# Patient Record
Sex: MALE | Race: OTHER | NOT HISPANIC OR LATINO | URBAN - METROPOLITAN AREA
[De-identification: names, ages, dates, MRNs, and addresses within clinical notes are randomized per-mention and may not be internally consistent; named-entity substitution may affect disease eponyms.]

---

## 2017-04-20 ENCOUNTER — INPATIENT (INPATIENT)
Facility: HOSPITAL | Age: 67
LOS: 0 days | Discharge: HOME | DRG: 247 | End: 2017-04-21
Attending: INTERNAL MEDICINE | Admitting: INTERNAL MEDICINE
Payer: MEDICARE

## 2017-04-20 VITALS
RESPIRATION RATE: 16 BRPM | DIASTOLIC BLOOD PRESSURE: 87 MMHG | SYSTOLIC BLOOD PRESSURE: 140 MMHG | OXYGEN SATURATION: 97 % | WEIGHT: 155.87 LBS | HEIGHT: 67 IN | TEMPERATURE: 96 F | HEART RATE: 64 BPM

## 2017-04-20 DIAGNOSIS — K40.20 BILATERAL INGUINAL HERNIA, WITHOUT OBSTRUCTION OR GANGRENE, NOT SPECIFIED AS RECURRENT: Chronic | ICD-10-CM

## 2017-04-20 LAB
ALBUMIN SERPL ELPH-MCNC: 3.3 G/DL — LOW (ref 3.4–5)
ALP SERPL-CCNC: 76 U/L — SIGNIFICANT CHANGE UP (ref 40–120)
ALT FLD-CCNC: 18 U/L — SIGNIFICANT CHANGE UP (ref 12–42)
ANION GAP SERPL CALC-SCNC: 8 MMOL/L — LOW (ref 9–16)
APTT BLD: 67 SEC — HIGH (ref 27.5–37.4)
AST SERPL-CCNC: 21 U/L — SIGNIFICANT CHANGE UP (ref 15–37)
BASOPHILS NFR BLD AUTO: 0.4 % — SIGNIFICANT CHANGE UP (ref 0–2)
BILIRUB SERPL-MCNC: 0.7 MG/DL — SIGNIFICANT CHANGE UP (ref 0.2–1.2)
BUN SERPL-MCNC: 11 MG/DL — SIGNIFICANT CHANGE UP (ref 7–23)
CALCIUM SERPL-MCNC: 8.2 MG/DL — LOW (ref 8.5–10.5)
CHLORIDE SERPL-SCNC: 107 MMOL/L — SIGNIFICANT CHANGE UP (ref 96–108)
CHOLEST SERPL-MCNC: 111 MG/DL — SIGNIFICANT CHANGE UP
CK MB CFR SERPL CALC: 4.7 NG/ML — HIGH (ref 0.5–3.6)
CO2 SERPL-SCNC: 26 MMOL/L — SIGNIFICANT CHANGE UP (ref 22–31)
CREAT SERPL-MCNC: 1.14 MG/DL — SIGNIFICANT CHANGE UP (ref 0.5–1.3)
EOSINOPHIL NFR BLD AUTO: 1.6 % — SIGNIFICANT CHANGE UP (ref 0–6)
GLUCOSE SERPL-MCNC: 88 MG/DL — SIGNIFICANT CHANGE UP (ref 70–99)
HBA1C BLD-MCNC: 5.9 % — HIGH (ref 4.8–5.6)
HCT VFR BLD CALC: 37.5 % — LOW (ref 39–50)
HDLC SERPL-MCNC: 57 MG/DL — SIGNIFICANT CHANGE UP
HGB BLD-MCNC: 12.7 G/DL — LOW (ref 13–17)
INR BLD: 1.04 — SIGNIFICANT CHANGE UP (ref 0.88–1.16)
LIPID PNL WITH DIRECT LDL SERPL: 35 MG/DL — SIGNIFICANT CHANGE UP
LYMPHOCYTES # BLD AUTO: 34.5 % — SIGNIFICANT CHANGE UP (ref 13–44)
MCHC RBC-ENTMCNC: 27.8 PG — SIGNIFICANT CHANGE UP (ref 27–34)
MCHC RBC-ENTMCNC: 33.9 G/DL — SIGNIFICANT CHANGE UP (ref 32–36)
MCV RBC AUTO: 82.1 FL — SIGNIFICANT CHANGE UP (ref 80–100)
MONOCYTES NFR BLD AUTO: 9.5 % — SIGNIFICANT CHANGE UP (ref 2–14)
NEUTROPHILS NFR BLD AUTO: 54 % — SIGNIFICANT CHANGE UP (ref 43–77)
PLATELET # BLD AUTO: 339 K/UL — SIGNIFICANT CHANGE UP (ref 150–400)
POTASSIUM SERPL-MCNC: 3.5 MMOL/L — SIGNIFICANT CHANGE UP (ref 3.5–5.3)
POTASSIUM SERPL-SCNC: 3.5 MMOL/L — SIGNIFICANT CHANGE UP (ref 3.5–5.3)
PROT SERPL-MCNC: 6.6 G/DL — SIGNIFICANT CHANGE UP (ref 6.4–8.2)
PROTHROM AB SERPL-ACNC: 11.5 SEC — SIGNIFICANT CHANGE UP (ref 9.8–12.7)
RBC # BLD: 4.57 M/UL — SIGNIFICANT CHANGE UP (ref 4.2–5.8)
RBC # FLD: 14 % — SIGNIFICANT CHANGE UP (ref 10.3–16.9)
SODIUM SERPL-SCNC: 141 MMOL/L — SIGNIFICANT CHANGE UP (ref 135–145)
TOTAL CHOLESTEROL/HDL RATIO MEASUREMENT: 1.9 RATIO — SIGNIFICANT CHANGE UP
TRIGL SERPL-MCNC: 96 MG/DL — SIGNIFICANT CHANGE UP
WBC # BLD: 6.8 K/UL — SIGNIFICANT CHANGE UP (ref 3.8–10.5)
WBC # FLD AUTO: 6.8 K/UL — SIGNIFICANT CHANGE UP (ref 3.8–10.5)

## 2017-04-20 PROCEDURE — 93454 CORONARY ARTERY ANGIO S&I: CPT | Mod: 26,XU

## 2017-04-20 PROCEDURE — 93010 ELECTROCARDIOGRAM REPORT: CPT

## 2017-04-20 PROCEDURE — 92928 PRQ TCAT PLMT NTRAC ST 1 LES: CPT | Mod: LC

## 2017-04-20 PROCEDURE — 92921: CPT | Mod: LC

## 2017-04-20 RX ORDER — CLOPIDOGREL BISULFATE 75 MG/1
75 TABLET, FILM COATED ORAL DAILY
Qty: 0 | Refills: 0 | Status: DISCONTINUED | OUTPATIENT
Start: 2017-04-21 | End: 2017-04-21

## 2017-04-20 RX ORDER — PANTOPRAZOLE SODIUM 20 MG/1
40 TABLET, DELAYED RELEASE ORAL
Qty: 0 | Refills: 0 | Status: DISCONTINUED | OUTPATIENT
Start: 2017-04-20 | End: 2017-04-21

## 2017-04-20 RX ORDER — ATORVASTATIN CALCIUM 80 MG/1
40 TABLET, FILM COATED ORAL AT BEDTIME
Qty: 0 | Refills: 0 | Status: DISCONTINUED | OUTPATIENT
Start: 2017-04-20 | End: 2017-04-21

## 2017-04-20 RX ORDER — SODIUM CHLORIDE 9 MG/ML
500 INJECTION INTRAMUSCULAR; INTRAVENOUS; SUBCUTANEOUS
Qty: 0 | Refills: 0 | Status: DISCONTINUED | OUTPATIENT
Start: 2017-04-20 | End: 2017-04-21

## 2017-04-20 RX ORDER — PANTOPRAZOLE SODIUM 20 MG/1
1 TABLET, DELAYED RELEASE ORAL
Qty: 0 | Refills: 0 | COMMUNITY

## 2017-04-20 RX ORDER — AMLODIPINE BESYLATE 2.5 MG/1
1 TABLET ORAL
Qty: 0 | Refills: 0 | COMMUNITY

## 2017-04-20 RX ORDER — FOLIC ACID 0.8 MG
1 TABLET ORAL DAILY
Qty: 0 | Refills: 0 | Status: DISCONTINUED | OUTPATIENT
Start: 2017-04-20 | End: 2017-04-21

## 2017-04-20 RX ORDER — CLOPIDOGREL BISULFATE 75 MG/1
575 TABLET, FILM COATED ORAL ONCE
Qty: 0 | Refills: 0 | Status: DISCONTINUED | OUTPATIENT
Start: 2017-04-20 | End: 2017-04-20

## 2017-04-20 RX ORDER — ISOSORBIDE MONONITRATE 60 MG/1
1 TABLET, EXTENDED RELEASE ORAL
Qty: 0 | Refills: 0 | COMMUNITY

## 2017-04-20 RX ORDER — THIAMINE MONONITRATE (VIT B1) 100 MG
1 TABLET ORAL
Qty: 0 | Refills: 0 | COMMUNITY

## 2017-04-20 RX ORDER — ASPIRIN/CALCIUM CARB/MAGNESIUM 324 MG
81 TABLET ORAL DAILY
Qty: 0 | Refills: 0 | Status: DISCONTINUED | OUTPATIENT
Start: 2017-04-20 | End: 2017-04-21

## 2017-04-20 RX ORDER — AMLODIPINE BESYLATE 2.5 MG/1
10 TABLET ORAL DAILY
Qty: 0 | Refills: 0 | Status: DISCONTINUED | OUTPATIENT
Start: 2017-04-20 | End: 2017-04-21

## 2017-04-20 RX ORDER — POTASSIUM CHLORIDE 20 MEQ
40 PACKET (EA) ORAL ONCE
Qty: 0 | Refills: 0 | Status: COMPLETED | OUTPATIENT
Start: 2017-04-20 | End: 2017-04-20

## 2017-04-20 RX ORDER — CLOPIDOGREL BISULFATE 75 MG/1
525 TABLET, FILM COATED ORAL ONCE
Qty: 0 | Refills: 0 | Status: COMPLETED | OUTPATIENT
Start: 2017-04-20 | End: 2017-04-20

## 2017-04-20 RX ORDER — ASPIRIN/CALCIUM CARB/MAGNESIUM 324 MG
243 TABLET ORAL ONCE
Qty: 0 | Refills: 0 | Status: COMPLETED | OUTPATIENT
Start: 2017-04-20 | End: 2017-04-20

## 2017-04-20 RX ORDER — FOLIC ACID 0.8 MG
1 TABLET ORAL
Qty: 0 | Refills: 0 | COMMUNITY

## 2017-04-20 RX ORDER — ISOSORBIDE MONONITRATE 60 MG/1
30 TABLET, EXTENDED RELEASE ORAL DAILY
Qty: 0 | Refills: 0 | Status: DISCONTINUED | OUTPATIENT
Start: 2017-04-20 | End: 2017-04-21

## 2017-04-20 RX ORDER — CHLORHEXIDINE GLUCONATE 213 G/1000ML
1 SOLUTION TOPICAL ONCE
Qty: 0 | Refills: 0 | Status: DISCONTINUED | OUTPATIENT
Start: 2017-04-20 | End: 2017-04-21

## 2017-04-20 RX ORDER — THIAMINE MONONITRATE (VIT B1) 100 MG
100 TABLET ORAL DAILY
Qty: 0 | Refills: 0 | Status: DISCONTINUED | OUTPATIENT
Start: 2017-04-20 | End: 2017-04-21

## 2017-04-20 RX ORDER — ATORVASTATIN CALCIUM 80 MG/1
1 TABLET, FILM COATED ORAL
Qty: 0 | Refills: 0 | COMMUNITY

## 2017-04-20 RX ADMIN — CLOPIDOGREL BISULFATE 525 MILLIGRAM(S): 75 TABLET, FILM COATED ORAL at 17:13

## 2017-04-20 RX ADMIN — AMLODIPINE BESYLATE 10 MILLIGRAM(S): 2.5 TABLET ORAL at 21:32

## 2017-04-20 RX ADMIN — Medication 100 MILLIGRAM(S): at 21:32

## 2017-04-20 RX ADMIN — ATORVASTATIN CALCIUM 40 MILLIGRAM(S): 80 TABLET, FILM COATED ORAL at 21:32

## 2017-04-20 RX ADMIN — Medication 243 MILLIGRAM(S): at 17:16

## 2017-04-20 RX ADMIN — PANTOPRAZOLE SODIUM 40 MILLIGRAM(S): 20 TABLET, DELAYED RELEASE ORAL at 21:32

## 2017-04-20 RX ADMIN — Medication 40 MILLIEQUIVALENT(S): at 17:24

## 2017-04-20 RX ADMIN — ISOSORBIDE MONONITRATE 30 MILLIGRAM(S): 60 TABLET, EXTENDED RELEASE ORAL at 21:32

## 2017-04-20 RX ADMIN — Medication 10 MILLIGRAM(S): at 21:32

## 2017-04-20 RX ADMIN — Medication 1 MILLIGRAM(S): at 21:32

## 2017-04-20 NOTE — H&P ADULT - HISTORY OF PRESENT ILLNESS
SKELETON    67 yo male, current cocaine abuser, PMHx HTN, hyperlipidemia, CVA (thalamic stroke 11/2016) presented to [facility] on 4/19/17 endorsing diaphoresis, n/v, dizziness and pt lost consciousness for a few seconds. After syncopal episode pt endorsed nonradiating 7/10 stabbing chest pain. Pt had been drinking with friends and utox was positive for cocaine. Pt admitted for ACS. Peak troponin 0.216. Pt had LATOYA with Cr 2 on admission which resolved to 1.1 after IV hydration. Diagnostic cath on 4/19/17 showed LM normal, D1 80%. dLCX 80%. OM1 90%. mRCA 80%. dRCA 90%. EF 60%. EDP 9. Echo 4/19/17 mild concentric LVH. EF 60-65%. Impaired relaxation. LA mildly dilated. Mild TR. 65 yo male, current cocaine abuser, PMHx HTN, hyperlipidemia, CVA (ischemic, thalamic stroke 11/2016 with residual left sided weakness) presented to [facility] ED on 4/19/17 endorsing diaphoresis, n/v, dizziness and pt lost consciousness for a few seconds. After syncopal episode pt endorsed nonradiating 7/10 stabbing chest pain at rest. Pt had been drinking with friends and utox was positive for cocaine. Pt admitted for ACS. Peak troponin 0.216. Pt had LATOYA with Cr 2 on admission which resolved to 1.1 after IV hydration. Diagnostic cath on 4/19/17 showed LM normal, D1 80%. dLCX 80%. OM1 90%. mRCA 80%. dRCA 90%. EF 60%. EDP 9. Echo 4/19/17 mild concentric LVH. EF 60-65%. Impaired relaxation. LA mildly dilated. Mild TR. Pt now transferred to [facility] for staged PCI. Pt was given Aspirin 81mg and Plavix 75mg on 4/20/17 prior to transfer. Pt denies being on Plavix at home and was given Plavix 525mg and Aspirin 243mg prior to procedure to complete load. K 3.5, repleted with KCl 40mequ. Pt was asymptomatic on arrival to [facility] cath lab. Pt was on a heparin drip which will be d/c'd prior to cardiac cath. EKG showed NSR @ 65 bpm with RBBB. Neuro exam wnl except for left pronator drift. Strength 5/5 all extremities left not significantly weaker than right. No facial droop, pupils equal small b/l, EOMI, A&Ox3. Pt precathed and consented for left cardiac catheterization with staged PCI with Dr. Hernandez.

## 2017-04-20 NOTE — H&P ADULT - ASSESSMENT
67 yo male, current cocaine abuser, PMHx HTN, hyperlipidemia, CVA (ischemic, thalamic stroke 11/2016 with residual left sided weakness) presented to [facility] on 4/19/17 endorsing diaphoresis, n/v, dizziness and pt lost consciousness for a few seconds. After syncopal episode pt endorsed nonradiating 7/10 stabbing chest pain at rest. Pt had been drinking with friends and utox was positive for cocaine. Pt admitted for ACS. Peak troponin 0.216. Pt had LATOYA with Cr 2 on admission which resolved to 1.1 after IV hydration. Diagnostic cath on 4/19/17 showed LM normal, D1 80%. dLCX 80%. OM1 90%. mRCA 80%. dRCA 90%. EF 60%. EDP 9. Echo 4/19/17 mild concentric LVH. EF 60-65%. Impaired relaxation. LA mildly dilated. Mild TR. Pt now transferred to [facility] for staged PCI. Pt was given Aspirin 81mg and Plavix 75mg on 4/20/17 prior to transfer. Pt denies being on Plavix at home and was given Plavix 525mg and Aspirin 243mg prior to procedure to complete load. K 3.5, repleted with KCl 40mequ. Pt was asymptomatic on arrival to [facility] cath lab. Pt was on a heparin drip which will be d/c'd prior to cardiac cath. EKG showed NSR @ 65 bpm with RBBB. Neuro exam wnl except for left pronator drift. Strength 5/5 all extremities left not significantly weaker than right. No facial droop, pupils equal small b/l, EOMI, A&Ox3. Pt precathed and consented for left cardiac catheterization with staged PCI with Dr. Hernanedz.

## 2017-04-20 NOTE — H&P ADULT - MOTOR
Strength 5/5 all extremities, positive left pronator drift, no significant difference in strength in extremities.

## 2017-04-21 VITALS
RESPIRATION RATE: 16 BRPM | HEART RATE: 69 BPM | DIASTOLIC BLOOD PRESSURE: 84 MMHG | OXYGEN SATURATION: 99 % | SYSTOLIC BLOOD PRESSURE: 142 MMHG

## 2017-04-21 LAB
ANION GAP SERPL CALC-SCNC: 9 MMOL/L — SIGNIFICANT CHANGE UP (ref 9–16)
BUN SERPL-MCNC: 13 MG/DL — SIGNIFICANT CHANGE UP (ref 7–23)
CALCIUM SERPL-MCNC: 8.3 MG/DL — LOW (ref 8.5–10.5)
CHLORIDE SERPL-SCNC: 105 MMOL/L — SIGNIFICANT CHANGE UP (ref 96–108)
CO2 SERPL-SCNC: 26 MMOL/L — SIGNIFICANT CHANGE UP (ref 22–31)
CREAT SERPL-MCNC: 1.11 MG/DL — SIGNIFICANT CHANGE UP (ref 0.5–1.3)
GLUCOSE SERPL-MCNC: 105 MG/DL — HIGH (ref 70–99)
HCT VFR BLD CALC: 33.1 % — LOW (ref 39–50)
HGB BLD-MCNC: 11.1 G/DL — LOW (ref 13–17)
MAGNESIUM SERPL-MCNC: 2 MG/DL — SIGNIFICANT CHANGE UP (ref 1.6–2.4)
MCHC RBC-ENTMCNC: 27.4 PG — SIGNIFICANT CHANGE UP (ref 27–34)
MCHC RBC-ENTMCNC: 33.5 G/DL — SIGNIFICANT CHANGE UP (ref 32–36)
MCV RBC AUTO: 81.7 FL — SIGNIFICANT CHANGE UP (ref 80–100)
PLATELET # BLD AUTO: 286 K/UL — SIGNIFICANT CHANGE UP (ref 150–400)
POTASSIUM SERPL-MCNC: 3.3 MMOL/L — LOW (ref 3.5–5.3)
POTASSIUM SERPL-SCNC: 3.3 MMOL/L — LOW (ref 3.5–5.3)
RBC # BLD: 4.05 M/UL — LOW (ref 4.2–5.8)
RBC # FLD: 14 % — SIGNIFICANT CHANGE UP (ref 10.3–16.9)
SODIUM SERPL-SCNC: 140 MMOL/L — SIGNIFICANT CHANGE UP (ref 135–145)
WBC # BLD: 7.9 K/UL — SIGNIFICANT CHANGE UP (ref 3.8–10.5)
WBC # FLD AUTO: 7.9 K/UL — SIGNIFICANT CHANGE UP (ref 3.8–10.5)

## 2017-04-21 PROCEDURE — C1769: CPT

## 2017-04-21 PROCEDURE — 80053 COMPREHEN METABOLIC PANEL: CPT

## 2017-04-21 PROCEDURE — C1887: CPT

## 2017-04-21 PROCEDURE — 85025 COMPLETE CBC W/AUTO DIFF WBC: CPT

## 2017-04-21 PROCEDURE — 99239 HOSP IP/OBS DSCHRG MGMT >30: CPT

## 2017-04-21 PROCEDURE — C1889: CPT

## 2017-04-21 PROCEDURE — 83735 ASSAY OF MAGNESIUM: CPT

## 2017-04-21 PROCEDURE — 82553 CREATINE MB FRACTION: CPT

## 2017-04-21 PROCEDURE — 93005 ELECTROCARDIOGRAM TRACING: CPT

## 2017-04-21 PROCEDURE — 85027 COMPLETE CBC AUTOMATED: CPT

## 2017-04-21 PROCEDURE — C1874: CPT

## 2017-04-21 PROCEDURE — C1725: CPT

## 2017-04-21 PROCEDURE — 83036 HEMOGLOBIN GLYCOSYLATED A1C: CPT

## 2017-04-21 PROCEDURE — 80048 BASIC METABOLIC PNL TOTAL CA: CPT

## 2017-04-21 PROCEDURE — 93010 ELECTROCARDIOGRAM REPORT: CPT

## 2017-04-21 PROCEDURE — 80061 LIPID PANEL: CPT

## 2017-04-21 PROCEDURE — 36415 COLL VENOUS BLD VENIPUNCTURE: CPT

## 2017-04-21 PROCEDURE — 85610 PROTHROMBIN TIME: CPT

## 2017-04-21 PROCEDURE — C1894: CPT

## 2017-04-21 PROCEDURE — 85730 THROMBOPLASTIN TIME PARTIAL: CPT

## 2017-04-21 PROCEDURE — C1760: CPT

## 2017-04-21 PROCEDURE — 82550 ASSAY OF CK (CPK): CPT

## 2017-04-21 RX ORDER — ASPIRIN/CALCIUM CARB/MAGNESIUM 324 MG
1 TABLET ORAL
Qty: 0 | Refills: 0 | COMMUNITY

## 2017-04-21 RX ORDER — CLOPIDOGREL BISULFATE 75 MG/1
1 TABLET, FILM COATED ORAL
Qty: 0 | Refills: 0 | COMMUNITY

## 2017-04-21 RX ORDER — ASPIRIN/CALCIUM CARB/MAGNESIUM 324 MG
1 TABLET ORAL
Qty: 30 | Refills: 11 | OUTPATIENT
Start: 2017-04-21 | End: 2018-04-15

## 2017-04-21 RX ORDER — CLOPIDOGREL BISULFATE 75 MG/1
1 TABLET, FILM COATED ORAL
Qty: 30 | Refills: 11 | OUTPATIENT
Start: 2017-04-21 | End: 2018-04-15

## 2017-04-21 RX ORDER — POTASSIUM CHLORIDE 20 MEQ
60 PACKET (EA) ORAL ONCE
Qty: 0 | Refills: 0 | Status: COMPLETED | OUTPATIENT
Start: 2017-04-21 | End: 2017-04-21

## 2017-04-21 RX ADMIN — Medication 100 MILLIGRAM(S): at 12:43

## 2017-04-21 RX ADMIN — AMLODIPINE BESYLATE 10 MILLIGRAM(S): 2.5 TABLET ORAL at 12:50

## 2017-04-21 RX ADMIN — ISOSORBIDE MONONITRATE 30 MILLIGRAM(S): 60 TABLET, EXTENDED RELEASE ORAL at 12:43

## 2017-04-21 RX ADMIN — CLOPIDOGREL BISULFATE 75 MILLIGRAM(S): 75 TABLET, FILM COATED ORAL at 12:43

## 2017-04-21 RX ADMIN — Medication 60 MILLIEQUIVALENT(S): at 12:43

## 2017-04-21 RX ADMIN — Medication 1 MILLIGRAM(S): at 12:43

## 2017-04-21 RX ADMIN — PANTOPRAZOLE SODIUM 40 MILLIGRAM(S): 20 TABLET, DELAYED RELEASE ORAL at 06:19

## 2017-04-21 RX ADMIN — Medication 10 MILLIGRAM(S): at 06:19

## 2017-04-21 RX ADMIN — Medication 81 MILLIGRAM(S): at 12:43

## 2017-04-21 NOTE — DISCHARGE NOTE ADULT - HOSPITAL COURSE
65 y/o male cocaine user with hx of CVA HTN and HLD, transferred to [facility] for PCI of CAD seen on dx cath at [facility].  Pt now s/p PCI ZEINAB OM1. Pt to return to [facility] for staged PCI of RCA.    Pt stable for discharge.  VSS  Labs acceptable, potassium repleted.  Physical exam unremarkable, groin stable.  Pt to follow up with Dr. Ryan Velazquez and to return for staged PCI.

## 2017-04-21 NOTE — DISCHARGE NOTE ADULT - MEDICATION SUMMARY - MEDICATIONS TO TAKE
I will START or STAY ON the medications listed below when I get home from the hospital:    aspirin 81 mg oral tablet  -- 1 tab(s) by mouth once a day  -- Indication: For Stent    Vasotec 10 mg oral tablet  -- 1 tab(s) by mouth once a day  -- Indication: For Hypertension    isosorbide mononitrate 30 mg oral tablet, extended release  -- 1 tab(s) by mouth once a day (in the morning)  -- Indication: For Angina    Lipitor 40 mg oral tablet  -- 1 tab(s) by mouth once a day  -- Indication: For Cholesterol    Plavix 75 mg oral tablet  -- 1 tab(s) by mouth once a day  -- Indication: For Stent    Norvasc 10 mg oral tablet  -- 1 tab(s) by mouth once a day  -- Indication: For Hypertension    Protonix 40 mg oral delayed release tablet  -- 1 tab(s) by mouth once a day  -- Indication: For GERD    Vitamin B1 100 mg oral tablet  -- 1 tab(s) by mouth once a day  -- Indication: For Supplement    folic acid 1 mg oral tablet  -- 1 tab(s) by mouth once a day  -- Indication: For Supplement

## 2017-04-21 NOTE — DISCHARGE NOTE ADULT - CARE PLAN
Principal Discharge DX:	CAD (coronary artery disease)  Goal:	Continue taking Aspirin and Plavix every day as prescribed.  Instructions for follow-up, activity and diet:	Follow up with Dr. Ryan Velazquez in 1-2 weeks.  Address: [facility] [place] [address]  Phone: [phone]    The procedure was done through your right groin. Please monitor for any signs of bleeding, blood collection under the skin, extreme pain or any signs of infection. If you experience any of these symptoms, please call your doctor or go to the nearest ER.   You can shower but no baths for 5 days.   No strenuous activities or heavy lifting for at least 5 days. Principal Discharge DX:	CAD (coronary artery disease)  Goal:	Continue taking Aspirin and Plavix every day as prescribed.  Instructions for follow-up, activity and diet:	Follow up with Dr. Ryan Velazquez in 1-2 weeks.  Address: [facility] [place] [address]  Phone: [phone]    The procedure was done through your right groin. Please monitor for any signs of bleeding, blood collection under the skin, extreme pain or any signs of infection. If you experience any of these symptoms, please call your doctor or go to the nearest ER.   You can shower but no baths for 5 days.   No strenuous activities or heavy lifting for at least 5 days.

## 2017-04-21 NOTE — DISCHARGE NOTE ADULT - CARE PROVIDER_API CALL
Ryan Velazquez), Cardiology; Interventional Cardiology  [address] [place] [address]  Phone: [phone]  Fax: [phone]

## 2017-04-21 NOTE — DISCHARGE NOTE ADULT - PATIENT PORTAL LINK FT
“You can access the FollowHealth Patient Portal, offered by [facility], by registering with the following website: [email or web address]”

## 2017-04-24 DIAGNOSIS — I45.10 UNSPECIFIED RIGHT BUNDLE-BRANCH BLOCK: ICD-10-CM

## 2017-04-24 DIAGNOSIS — I10 ESSENTIAL (PRIMARY) HYPERTENSION: ICD-10-CM

## 2017-04-24 DIAGNOSIS — Z79.02 LONG TERM (CURRENT) USE OF ANTITHROMBOTICS/ANTIPLATELETS: ICD-10-CM

## 2017-04-24 DIAGNOSIS — I25.10 ATHEROSCLEROTIC HEART DISEASE OF NATIVE CORONARY ARTERY WITHOUT ANGINA PECTORIS: ICD-10-CM

## 2017-04-24 DIAGNOSIS — F14.10 COCAINE ABUSE, UNCOMPLICATED: ICD-10-CM

## 2017-04-24 DIAGNOSIS — I69.354 HEMIPLEGIA AND HEMIPARESIS FOLLOWING CEREBRAL INFARCTION AFFECTING LEFT NON-DOMINANT SIDE: ICD-10-CM

## 2017-04-24 DIAGNOSIS — E78.5 HYPERLIPIDEMIA, UNSPECIFIED: ICD-10-CM

## 2017-04-24 DIAGNOSIS — Z79.82 LONG TERM (CURRENT) USE OF ASPIRIN: ICD-10-CM

## 2017-05-15 RX ORDER — CHLORHEXIDINE GLUCONATE 213 G/1000ML
1 SOLUTION TOPICAL ONCE
Qty: 0 | Refills: 0 | Status: DISCONTINUED | OUTPATIENT
Start: 2017-05-24 | End: 2017-05-25

## 2017-05-15 NOTE — H&P ADULT - NSHPPHYSICALEXAM_GEN_ALL_CORE
Appearance: Normal	  HEENT:   Normal oral mucosa, PERRL, EOMI	  Neck: Supple,  - JVD; No Carotid Bruit and 2+ pulses B/L  Cardiovascular: Normal S1 S2, No JVD, No murmurs  Respiratory: Lungs clear to auscultation/No Rales, Rhonchi, Wheezing	  Gastrointestinal:  Soft, Non-tender	  Skin: No rashes, No ecchymoses, No cyanosis  Extremities: Normal range of motion, No clubbing, cyanosis or edema  Vascular: Femoral pulses 2+ b/l without bruit, DP 1+ b/l, PT 1+ b/l  Neurologic: Non-focal  Psychiatry: A & O x 3, Mood & affect appropriate

## 2017-05-15 NOTE — H&P ADULT - HISTORY OF PRESENT ILLNESS
SKELETON    65 yo male, current cocaine abuser, PMHx HTN, hyperlipidemia, CVA (ischemic, thalamic stroke 11/2016 with residual left sided weakness), known CAD initially presented to [facility] with unstable angina requiring cardiac catheterization 4/19/2017 revealing LM normal, D1 80%. dLCX 80%. OM1 90%. mRCA 80%. dRCA 90%. EF 60%. Pt was subsequently transferred to [facility] for staged PCI of OM1 and is now s/p ZEINAB to lower branch of OM1 followed by PTCA of the OM1 upper branch lesion and kissing of the bifurcation lesion 4/20/17. Pt now recommended  returns for staged PCI of residual RCA disease. Pt reports (insert symptoms).    In light of patient’s risk factors, known CAD and CCS angina Class (INSERT), pt now presents for staged PCI of RCA disease.     Prior Noninvasive Studies.   Echo 4/19/17: mild concentric LVH. EF 60-65%. Impaired relaxation. LA mildly dilated. Mild TR. **** PT TO BRING IN ALL MEDS       66 y.o Male, Cocaine abuser, PMHx HTN, hyperlipidemia, CVA (ischemic, thalamic stroke 11/2016 with residual left sided weakness), Known CAD initially presented to [facility] with unstable angina requiring cardiac catheterization 4/19/2017 revealing LM normal, D1 80%. dLCX 80%. OM1 90%. mRCA 80%. dRCA 90%. EF 60%. Pt was subsequently transferred to [facility] for staged PCI of OM1 and is now s/p ZEINAB to lower branch of OM1 followed by PTCA of the OM1 upper branch lesion and kissing of the bifurcation lesion 4/20/17. Pt now recommended  returns for staged PCI of residual RCA disease. Pt reports doing well since his recent PCI.  He denies experiencing any resting  or exertional CP, SOB, palpitations, dizziness, or decrease in exercise tolerance.       In light of patient’s risk factors, known CAD, pt now returns for pt now presents for staged PCI of RCA disease.     Prior Noninvasive Studies.   Echo 4/19/17: mild concentric LVH. EF 60-65%. Impaired relaxation. LA mildly dilated. Mild TR. **** PT TO BRING IN ALL MEDS     66 y.o Male, Cocaine abuser, PMHx HTN, hyperlipidemia, CVA (ischemic, thalamic stroke 11/2016 with residual left sided weakness), Known CAD initially presented to [facility] with unstable angina requiring cardiac catheterization 4/19/2017 revealing LM normal, D1 80%. dLCX 80%. OM1 90%. mRCA 80%. dRCA 90%. EF 60%. Pt was subsequently transferred to [facility] for staged PCI of OM1 and is now s/p ZEINAB to lower branch of OM1 followed by PTCA of the OM1 upper branch lesion and kissing of the bifurcation lesion 4/20/17. Pt now recommended  returns for staged PCI of residual RCA disease. Pt reports doing well since his recent PCI.  He denies experiencing any resting  or exertional CP, SOB, palpitations, dizziness, or decrease in exercise tolerance.       In light of patient’s risk factors, known CAD, pt now returns for pt now presents for staged PCI of RCA disease.     Prior Noninvasive Studies.   Echo 4/19/17: mild concentric LVH. EF 60-65%. Impaired relaxation. LA mildly dilated. Mild TR. **OF NOTE, patient did not  Plavix from the pharmacy, has not been taking since prior PCI. Dr. Davis informed. Will re-load patient.     66 y.o Male, Cocaine abuser, PMHx HTN, hyperlipidemia, CVA (ischemic, thalamic stroke 11/2016 with residual left sided weakness), Known CAD initially presented to [facility] with unstable angina requiring cardiac catheterization 4/19/2017 revealing LM normal, D1 80%. dLCX 80%. OM1 90%. mRCA 80%. dRCA 90%. EF 60%. Pt was subsequently transferred to [facility] for staged PCI of OM1 and is now s/p ZEINAB to lower branch of OM1 followed by PTCA of the OM1 upper branch lesion and kissing of the bifurcation lesion 4/20/17. Pt now recommended  returns for staged PCI of residual RCA disease. Pt reports doing well since his recent PCI.  He denies experiencing any resting  or exertional CP, SOB, palpitations, dizziness, or decrease in exercise tolerance. Denies recent cocaine use.     In light of patient’s risk factors, known CAD, pt now returns for pt now presents for staged PCI of RCA disease.     Prior Noninvasive Studies.   Echo 4/19/17: mild concentric LVH. EF 60-65%. Impaired relaxation. LA mildly dilated. Mild TR.

## 2017-05-15 NOTE — H&P ADULT - ASSESSMENT
66 y.o Male, Cocaine abuser, PMHx HTN, hyperlipidemia, CVA (ischemic, thalamic stroke 11/2016 with residual left sided weakness), Known CAD initially presented to [facility] and transferred to [facility] for PCI of OM1. Patient now presents for staged intervention of known residual RCA disease.    A/P:  Patient never picked up Plavix from pharmacy, has not been taking since PCI. Reloaded with Plavix 600. Took ASA 81 this AM, will dose 243 mg. Dr Davis aware. Cr 1.7, receiving fluids at 100cc/hr precath.

## 2017-05-24 ENCOUNTER — INPATIENT (INPATIENT)
Facility: HOSPITAL | Age: 67
LOS: 0 days | Discharge: HOME | DRG: 247 | End: 2017-05-25
Attending: INTERNAL MEDICINE | Admitting: INTERNAL MEDICINE
Payer: MEDICARE

## 2017-05-24 VITALS
DIASTOLIC BLOOD PRESSURE: 91 MMHG | RESPIRATION RATE: 16 BRPM | HEART RATE: 56 BPM | SYSTOLIC BLOOD PRESSURE: 153 MMHG | OXYGEN SATURATION: 95 %

## 2017-05-24 DIAGNOSIS — K40.20 BILATERAL INGUINAL HERNIA, WITHOUT OBSTRUCTION OR GANGRENE, NOT SPECIFIED AS RECURRENT: Chronic | ICD-10-CM

## 2017-05-24 LAB
ANION GAP SERPL CALC-SCNC: 16 MMOL/L — SIGNIFICANT CHANGE UP (ref 5–17)
APTT BLD: 41.4 SEC — HIGH (ref 27.5–37.4)
BASOPHILS NFR BLD AUTO: 0.7 % — SIGNIFICANT CHANGE UP (ref 0–2)
BUN SERPL-MCNC: 23 MG/DL — SIGNIFICANT CHANGE UP (ref 7–23)
CALCIUM SERPL-MCNC: 9.8 MG/DL — SIGNIFICANT CHANGE UP (ref 8.4–10.5)
CHLORIDE SERPL-SCNC: 102 MMOL/L — SIGNIFICANT CHANGE UP (ref 96–108)
CHOLEST SERPL-MCNC: 157 MG/DL — SIGNIFICANT CHANGE UP (ref 10–199)
CK MB CFR SERPL CALC: 4.4 NG/ML — SIGNIFICANT CHANGE UP (ref 0–6.7)
CO2 SERPL-SCNC: 23 MMOL/L — SIGNIFICANT CHANGE UP (ref 22–31)
CREAT SERPL-MCNC: 1.6 MG/DL — HIGH (ref 0.5–1.3)
CRP SERPL-MCNC: <0.03 MG/DL — SIGNIFICANT CHANGE UP (ref 0–0.4)
EOSINOPHIL NFR BLD AUTO: 3.5 % — SIGNIFICANT CHANGE UP (ref 0–6)
GLUCOSE SERPL-MCNC: 131 MG/DL — HIGH (ref 70–99)
HCT VFR BLD CALC: 42.8 % — SIGNIFICANT CHANGE UP (ref 39–50)
HDLC SERPL-MCNC: 58 MG/DL — SIGNIFICANT CHANGE UP (ref 40–125)
HGB BLD-MCNC: 15.5 G/DL — SIGNIFICANT CHANGE UP (ref 13–17)
INR BLD: 1.01 — SIGNIFICANT CHANGE UP (ref 0.88–1.16)
LIPID PNL WITH DIRECT LDL SERPL: 82 MG/DL — SIGNIFICANT CHANGE UP
LYMPHOCYTES # BLD AUTO: 30.9 % — SIGNIFICANT CHANGE UP (ref 13–44)
MCHC RBC-ENTMCNC: 29 PG — SIGNIFICANT CHANGE UP (ref 27–34)
MCHC RBC-ENTMCNC: 36.2 G/DL — HIGH (ref 32–36)
MCV RBC AUTO: 80.1 FL — SIGNIFICANT CHANGE UP (ref 80–100)
MONOCYTES NFR BLD AUTO: 8.1 % — SIGNIFICANT CHANGE UP (ref 2–14)
NEUTROPHILS NFR BLD AUTO: 56.8 % — SIGNIFICANT CHANGE UP (ref 43–77)
PLATELET # BLD AUTO: 314 K/UL — SIGNIFICANT CHANGE UP (ref 150–400)
POTASSIUM SERPL-MCNC: 3.4 MMOL/L — LOW (ref 3.5–5.3)
POTASSIUM SERPL-SCNC: 3.4 MMOL/L — LOW (ref 3.5–5.3)
PROTHROM AB SERPL-ACNC: 11.2 SEC — SIGNIFICANT CHANGE UP (ref 9.8–12.7)
RBC # BLD: 5.34 M/UL — SIGNIFICANT CHANGE UP (ref 4.2–5.8)
RBC # FLD: 13.3 % — SIGNIFICANT CHANGE UP (ref 10.3–16.9)
SODIUM SERPL-SCNC: 141 MMOL/L — SIGNIFICANT CHANGE UP (ref 135–145)
TOTAL CHOLESTEROL/HDL RATIO MEASUREMENT: 2.7 RATIO — LOW (ref 3.4–9.6)
TRIGL SERPL-MCNC: 85 MG/DL — SIGNIFICANT CHANGE UP (ref 10–149)
WBC # BLD: 6.1 K/UL — SIGNIFICANT CHANGE UP (ref 3.8–10.5)
WBC # FLD AUTO: 6.1 K/UL — SIGNIFICANT CHANGE UP (ref 3.8–10.5)

## 2017-05-24 PROCEDURE — 92928 PRQ TCAT PLMT NTRAC ST 1 LES: CPT | Mod: RC

## 2017-05-24 PROCEDURE — 93010 ELECTROCARDIOGRAM REPORT: CPT

## 2017-05-24 PROCEDURE — 93454 CORONARY ARTERY ANGIO S&I: CPT | Mod: 26,XU

## 2017-05-24 RX ORDER — FOLIC ACID 0.8 MG
1 TABLET ORAL DAILY
Qty: 0 | Refills: 0 | Status: DISCONTINUED | OUTPATIENT
Start: 2017-05-24 | End: 2017-05-25

## 2017-05-24 RX ORDER — ASPIRIN/CALCIUM CARB/MAGNESIUM 324 MG
243 TABLET ORAL ONCE
Qty: 0 | Refills: 0 | Status: COMPLETED | OUTPATIENT
Start: 2017-05-24 | End: 2017-05-24

## 2017-05-24 RX ORDER — CLOPIDOGREL BISULFATE 75 MG/1
600 TABLET, FILM COATED ORAL ONCE
Qty: 0 | Refills: 0 | Status: COMPLETED | OUTPATIENT
Start: 2017-05-24 | End: 2017-05-24

## 2017-05-24 RX ORDER — ASPIRIN/CALCIUM CARB/MAGNESIUM 324 MG
81 TABLET ORAL DAILY
Qty: 0 | Refills: 0 | Status: DISCONTINUED | OUTPATIENT
Start: 2017-05-25 | End: 2017-05-25

## 2017-05-24 RX ORDER — ATORVASTATIN CALCIUM 80 MG/1
40 TABLET, FILM COATED ORAL AT BEDTIME
Qty: 0 | Refills: 0 | Status: DISCONTINUED | OUTPATIENT
Start: 2017-05-24 | End: 2017-05-25

## 2017-05-24 RX ORDER — AMLODIPINE BESYLATE 2.5 MG/1
10 TABLET ORAL DAILY
Qty: 0 | Refills: 0 | Status: DISCONTINUED | OUTPATIENT
Start: 2017-05-24 | End: 2017-05-25

## 2017-05-24 RX ORDER — SODIUM CHLORIDE 9 MG/ML
1000 INJECTION INTRAMUSCULAR; INTRAVENOUS; SUBCUTANEOUS
Qty: 0 | Refills: 0 | Status: DISCONTINUED | OUTPATIENT
Start: 2017-05-24 | End: 2017-05-25

## 2017-05-24 RX ORDER — POTASSIUM CHLORIDE 20 MEQ
60 PACKET (EA) ORAL ONCE
Qty: 0 | Refills: 0 | Status: COMPLETED | OUTPATIENT
Start: 2017-05-24 | End: 2017-05-24

## 2017-05-24 RX ORDER — CLOPIDOGREL BISULFATE 75 MG/1
75 TABLET, FILM COATED ORAL DAILY
Qty: 0 | Refills: 0 | Status: DISCONTINUED | OUTPATIENT
Start: 2017-05-25 | End: 2017-05-25

## 2017-05-24 RX ORDER — ISOSORBIDE MONONITRATE 60 MG/1
30 TABLET, EXTENDED RELEASE ORAL DAILY
Qty: 0 | Refills: 0 | Status: DISCONTINUED | OUTPATIENT
Start: 2017-05-24 | End: 2017-05-25

## 2017-05-24 RX ORDER — PANTOPRAZOLE SODIUM 20 MG/1
40 TABLET, DELAYED RELEASE ORAL
Qty: 0 | Refills: 0 | Status: DISCONTINUED | OUTPATIENT
Start: 2017-05-24 | End: 2017-05-25

## 2017-05-24 RX ORDER — SODIUM CHLORIDE 9 MG/ML
1000 INJECTION INTRAMUSCULAR; INTRAVENOUS; SUBCUTANEOUS
Qty: 0 | Refills: 0 | Status: DISCONTINUED | OUTPATIENT
Start: 2017-05-24 | End: 2017-05-24

## 2017-05-24 RX ORDER — THIAMINE MONONITRATE (VIT B1) 100 MG
100 TABLET ORAL DAILY
Qty: 0 | Refills: 0 | Status: DISCONTINUED | OUTPATIENT
Start: 2017-05-24 | End: 2017-05-25

## 2017-05-24 RX ADMIN — ATORVASTATIN CALCIUM 40 MILLIGRAM(S): 80 TABLET, FILM COATED ORAL at 22:42

## 2017-05-24 RX ADMIN — SODIUM CHLORIDE 100 MILLILITER(S): 9 INJECTION INTRAMUSCULAR; INTRAVENOUS; SUBCUTANEOUS at 10:40

## 2017-05-24 RX ADMIN — Medication 60 MILLIEQUIVALENT(S): at 15:07

## 2017-05-24 RX ADMIN — CLOPIDOGREL BISULFATE 600 MILLIGRAM(S): 75 TABLET, FILM COATED ORAL at 10:40

## 2017-05-24 RX ADMIN — Medication 243 MILLIGRAM(S): at 10:40

## 2017-05-25 VITALS — TEMPERATURE: 98 F

## 2017-05-25 LAB
ANION GAP SERPL CALC-SCNC: 13 MMOL/L — SIGNIFICANT CHANGE UP (ref 5–17)
BUN SERPL-MCNC: 14 MG/DL — SIGNIFICANT CHANGE UP (ref 7–23)
CALCIUM SERPL-MCNC: 8.9 MG/DL — SIGNIFICANT CHANGE UP (ref 8.4–10.5)
CHLORIDE SERPL-SCNC: 105 MMOL/L — SIGNIFICANT CHANGE UP (ref 96–108)
CO2 SERPL-SCNC: 23 MMOL/L — SIGNIFICANT CHANGE UP (ref 22–31)
CREAT SERPL-MCNC: 1.1 MG/DL — SIGNIFICANT CHANGE UP (ref 0.5–1.3)
GLUCOSE SERPL-MCNC: 88 MG/DL — SIGNIFICANT CHANGE UP (ref 70–99)
HCT VFR BLD CALC: 38.7 % — LOW (ref 39–50)
HGB BLD-MCNC: 13.4 G/DL — SIGNIFICANT CHANGE UP (ref 13–17)
MAGNESIUM SERPL-MCNC: 2 MG/DL — SIGNIFICANT CHANGE UP (ref 1.6–2.6)
MCHC RBC-ENTMCNC: 27.7 PG — SIGNIFICANT CHANGE UP (ref 27–34)
MCHC RBC-ENTMCNC: 34.6 G/DL — SIGNIFICANT CHANGE UP (ref 32–36)
MCV RBC AUTO: 80 FL — SIGNIFICANT CHANGE UP (ref 80–100)
PLATELET # BLD AUTO: 295 K/UL — SIGNIFICANT CHANGE UP (ref 150–400)
POTASSIUM SERPL-MCNC: 3.6 MMOL/L — SIGNIFICANT CHANGE UP (ref 3.5–5.3)
POTASSIUM SERPL-SCNC: 3.6 MMOL/L — SIGNIFICANT CHANGE UP (ref 3.5–5.3)
RBC # BLD: 4.84 M/UL — SIGNIFICANT CHANGE UP (ref 4.2–5.8)
RBC # FLD: 13.3 % — SIGNIFICANT CHANGE UP (ref 10.3–16.9)
SODIUM SERPL-SCNC: 141 MMOL/L — SIGNIFICANT CHANGE UP (ref 135–145)
WBC # BLD: 6.3 K/UL — SIGNIFICANT CHANGE UP (ref 3.8–10.5)
WBC # FLD AUTO: 6.3 K/UL — SIGNIFICANT CHANGE UP (ref 3.8–10.5)

## 2017-05-25 PROCEDURE — 85730 THROMBOPLASTIN TIME PARTIAL: CPT

## 2017-05-25 PROCEDURE — 82553 CREATINE MB FRACTION: CPT

## 2017-05-25 PROCEDURE — 85610 PROTHROMBIN TIME: CPT

## 2017-05-25 PROCEDURE — 80048 BASIC METABOLIC PNL TOTAL CA: CPT

## 2017-05-25 PROCEDURE — 99239 HOSP IP/OBS DSCHRG MGMT >30: CPT

## 2017-05-25 PROCEDURE — 93005 ELECTROCARDIOGRAM TRACING: CPT

## 2017-05-25 PROCEDURE — 80061 LIPID PANEL: CPT

## 2017-05-25 PROCEDURE — 83735 ASSAY OF MAGNESIUM: CPT

## 2017-05-25 PROCEDURE — C1725: CPT

## 2017-05-25 PROCEDURE — C1769: CPT

## 2017-05-25 PROCEDURE — 85027 COMPLETE CBC AUTOMATED: CPT

## 2017-05-25 PROCEDURE — C1887: CPT

## 2017-05-25 PROCEDURE — 85025 COMPLETE CBC W/AUTO DIFF WBC: CPT

## 2017-05-25 PROCEDURE — 82550 ASSAY OF CK (CPK): CPT

## 2017-05-25 PROCEDURE — 86140 C-REACTIVE PROTEIN: CPT

## 2017-05-25 PROCEDURE — C1874: CPT

## 2017-05-25 PROCEDURE — 36415 COLL VENOUS BLD VENIPUNCTURE: CPT

## 2017-05-25 RX ORDER — POTASSIUM CHLORIDE 20 MEQ
40 PACKET (EA) ORAL ONCE
Qty: 0 | Refills: 0 | Status: COMPLETED | OUTPATIENT
Start: 2017-05-25 | End: 2017-05-25

## 2017-05-25 RX ADMIN — Medication 40 MILLIEQUIVALENT(S): at 11:00

## 2017-05-25 RX ADMIN — AMLODIPINE BESYLATE 10 MILLIGRAM(S): 2.5 TABLET ORAL at 00:53

## 2017-05-25 RX ADMIN — Medication 10 MILLIGRAM(S): at 06:58

## 2017-05-25 RX ADMIN — Medication 81 MILLIGRAM(S): at 11:01

## 2017-05-25 RX ADMIN — Medication 1 MILLIGRAM(S): at 11:01

## 2017-05-25 RX ADMIN — ISOSORBIDE MONONITRATE 30 MILLIGRAM(S): 60 TABLET, EXTENDED RELEASE ORAL at 11:00

## 2017-05-25 RX ADMIN — Medication 100 MILLIGRAM(S): at 11:00

## 2017-05-25 RX ADMIN — PANTOPRAZOLE SODIUM 40 MILLIGRAM(S): 20 TABLET, DELAYED RELEASE ORAL at 06:58

## 2017-05-25 RX ADMIN — CLOPIDOGREL BISULFATE 75 MILLIGRAM(S): 75 TABLET, FILM COATED ORAL at 11:01

## 2017-05-25 NOTE — DISCHARGE NOTE ADULT - PLAN OF CARE
You have blockages in the blood vessels that give blood and oxygen to your heart. This is called coronary artery disease. You got a two drug eluting stents to your mid and distal RCA. It is VERY IMPORTANT that you take Aspirin 81mg once daily and Plavix (Clopidogrel) 75mg once daily to keep your stents open. Continue Atorvastatin (Lipitor) 40mg once daily at bedtime for cholesterol control. Continue Atorvastatin (Lipitor) 40mg once daily at bedtime for cholesterol control. Continue home blood pressure medications. Right wrist wound care: do not lift objects heavier than 5 lbs for 5 days. Do not do any strenuous activity for 5 days. Observe for signs of bleeding which include bleeding/sudden swelling to your right wrist area or numbness/tingling/pain/coolness to your right wrist/hand/fingers/forearm. If you experience this call your doctor and go to the nearest ED immediately. Follow up with Dr. Ryan Velazquez in 1-2 weeks.

## 2017-05-25 NOTE — DISCHARGE NOTE ADULT - MEDICATION SUMMARY - MEDICATIONS TO TAKE
I will START or STAY ON the medications listed below when I get home from the hospital:    aspirin 81 mg oral tablet  -- 1 tab(s) by mouth once a day  -- Indication: For Coronary artery disease and stent    Vasotec 10 mg oral tablet  -- 1 tab(s) by mouth once a day  -- Indication: For Blood pressure control    isosorbide mononitrate 30 mg oral tablet, extended release  -- 1 tab(s) by mouth once a day (in the morning)  -- Indication: For Chest pain    Lipitor 40 mg oral tablet  -- 1 tab(s) by mouth once a day  -- Indication: For Cholesterol control    Plavix 75 mg oral tablet  -- 1 tab(s) by mouth once a day  -- Indication: For Coronary artery disease and stent    Norvasc 10 mg oral tablet  -- 1 tab(s) by mouth once a day  -- Indication: For Blood pressure control    Protonix 40 mg oral delayed release tablet  -- 1 tab(s) by mouth once a day  -- Indication: For Stomach acid protection/GERD    Vitamin B1 100 mg oral tablet  -- 1 tab(s) by mouth once a day  -- Indication: For Vitamin supplement    folic acid 1 mg oral tablet  -- 1 tab(s) by mouth once a day  -- Indication: For Vitamin supplement

## 2017-05-25 NOTE — DISCHARGE NOTE ADULT - HOSPITAL COURSE
66 y.o Male, Cocaine abuser, PMHx HTN, hyperlipidemia, CVA (ischemic, thalamic stroke 11/2016 with residual left sided weakness), Known CAD initially presented to [facility] with unstable angina requiring cardiac catheterization 4/19/2017 revealing LM normal, D1 80%. dLCX 80%. OM1 90%. mRCA 80%. dRCA 90%. EF 60%. Pt was subsequently transferred to [facility] for staged PCI of OM1 and is now s/p ZEINAB to lower branch of OM1 followed by PTCA of the OM1 upper branch lesion and kissing of the bifurcation lesion 4/20/17. Pt now recommended  returns for staged PCI of residual RCA disease. Pt reports doing well since his recent PCI.  He denies experiencing any resting  or exertional CP, SOB, palpitations, dizziness, or decrease in exercise tolerance. Denies recent cocaine use. Pt now s/p cardiac cath on 5/24/17 ZEINAB x2 mid and distal RCA. EF 65%. Right Radial 4:30PM removed without complications. On 5/25/17 AM VSS. Physical exam wnl. Right wrist stable. +2 radial right side. K 3.6 repleted with KCl 40mequ. Pt stable for discharge as per Dr. Kevin. Pt will follow up with Dr. Ryan Velazquez in 1-2 weeks. Pt on Aspirin/Plavix/Atorvastatin/Enalapril/Amlodipine. Pt not on a beta blocker 2/2 cocaine use and sinus bradycardia to high 40s overnight. Pt was asymptomatic during hospitalization and denied dizziness, chest pain, sob, palpitations, nausea.

## 2017-05-25 NOTE — DISCHARGE NOTE ADULT - CARE PROVIDER_API CALL
Ryan Velazquez  [address] [place] [address]  Phone: [phone]  Fax: (       -

## 2017-05-25 NOTE — DISCHARGE NOTE ADULT - PATIENT PORTAL LINK FT
“You can access the FollowHealth Patient Portal, offered by [facility], by registering with the following website: [email or web address]”

## 2017-05-25 NOTE — DISCHARGE NOTE ADULT - CARE PLAN
Principal Discharge DX:	Coronary artery disease  Goal:	You have blockages in the blood vessels that give blood and oxygen to your heart. This is called coronary artery disease. You got a two drug eluting stents to your mid and distal RCA. It is VERY IMPORTANT that you take Aspirin 81mg once daily and Plavix (Clopidogrel) 75mg once daily to keep your stents open. Continue Atorvastatin (Lipitor) 40mg once daily at bedtime for cholesterol control.  Secondary Diagnosis:	Hypertension  Goal:	Continue home blood pressure medications.  Secondary Diagnosis:	Hyperlipidemia  Goal:	Continue Atorvastatin (Lipitor) 40mg once daily at bedtime for cholesterol control. Principal Discharge DX:	Coronary artery disease  Goal:	You have blockages in the blood vessels that give blood and oxygen to your heart. This is called coronary artery disease. You got a two drug eluting stents to your mid and distal RCA. It is VERY IMPORTANT that you take Aspirin 81mg once daily and Plavix (Clopidogrel) 75mg once daily to keep your stents open. Continue Atorvastatin (Lipitor) 40mg once daily at bedtime for cholesterol control.  Instructions for follow-up, activity and diet:	Right wrist wound care: do not lift objects heavier than 5 lbs for 5 days. Do not do any strenuous activity for 5 days. Observe for signs of bleeding which include bleeding/sudden swelling to your right wrist area or numbness/tingling/pain/coolness to your right wrist/hand/fingers/forearm. If you experience this call your doctor and go to the nearest ED immediately. Follow up with Dr. Ryan Velazquez in 1-2 weeks.  Secondary Diagnosis:	Hypertension  Goal:	Continue home blood pressure medications.  Secondary Diagnosis:	Hyperlipidemia  Goal:	Continue Atorvastatin (Lipitor) 40mg once daily at bedtime for cholesterol control.

## 2017-05-25 NOTE — DISCHARGE NOTE ADULT - PROVIDER TOKENS
FREE:[LAST:[Marcus],FIRST:[Ryan],PHONE:[phone]],FAX:[(   [phone]    -],ADDRESS:[[address] [place] [address]]]

## 2017-05-29 DIAGNOSIS — Z59.0 HOMELESSNESS: ICD-10-CM

## 2017-05-29 DIAGNOSIS — F14.10 COCAINE ABUSE, UNCOMPLICATED: ICD-10-CM

## 2017-05-29 DIAGNOSIS — R07.9 CHEST PAIN, UNSPECIFIED: ICD-10-CM

## 2017-05-29 DIAGNOSIS — K21.9 GASTRO-ESOPHAGEAL REFLUX DISEASE WITHOUT ESOPHAGITIS: ICD-10-CM

## 2017-05-29 DIAGNOSIS — I69.954 HEMIPLEGIA AND HEMIPARESIS FOLLOWING UNSPECIFIED CEREBROVASCULAR DISEASE AFFECTING LEFT NON-DOMINANT SIDE: ICD-10-CM

## 2017-05-29 DIAGNOSIS — I25.110 ATHEROSCLEROTIC HEART DISEASE OF NATIVE CORONARY ARTERY WITH UNSTABLE ANGINA PECTORIS: ICD-10-CM

## 2017-05-29 DIAGNOSIS — I10 ESSENTIAL (PRIMARY) HYPERTENSION: ICD-10-CM

## 2017-05-29 DIAGNOSIS — E78.5 HYPERLIPIDEMIA, UNSPECIFIED: ICD-10-CM

## 2017-05-29 SDOH — ECONOMIC STABILITY - HOUSING INSECURITY: HOMELESSNESS: Z59.0

## 2024-01-12 NOTE — PATIENT PROFILE ADULT. - NS PRO ABUSE SCREEN SUSPICION NEGLECT YN
Faustino Chávez,    It was great seeing you today. Thank you for coming in to see me. I have summarized the main points of our conversation below. Please do not hesitate to contact me if you have any questions.      Please get your lab tests completed BEFORE your next visit    I will see you back in 3 months
no
